# Patient Record
Sex: MALE | Race: BLACK OR AFRICAN AMERICAN | NOT HISPANIC OR LATINO | Employment: FULL TIME | URBAN - METROPOLITAN AREA
[De-identification: names, ages, dates, MRNs, and addresses within clinical notes are randomized per-mention and may not be internally consistent; named-entity substitution may affect disease eponyms.]

---

## 2023-11-24 ENCOUNTER — ANCILLARY PROCEDURE (OUTPATIENT)
Dept: RADIOLOGY | Facility: CLINIC | Age: 23
End: 2023-11-24
Payer: OTHER GOVERNMENT

## 2023-11-24 DIAGNOSIS — S09.92XA INJURY OF NOSE, INITIAL ENCOUNTER: ICD-10-CM

## 2023-11-24 PROCEDURE — 70160 X-RAY EXAM OF NASAL BONES: CPT | Mod: FY

## 2023-11-24 PROCEDURE — 70160 X-RAY EXAM OF NASAL BONES: CPT | Performed by: RADIOLOGY

## 2023-12-27 ENCOUNTER — OFFICE VISIT (OUTPATIENT)
Dept: PEDIATRICS | Facility: CLINIC | Age: 23
End: 2023-12-27
Payer: OTHER GOVERNMENT

## 2023-12-27 VITALS
SYSTOLIC BLOOD PRESSURE: 110 MMHG | HEIGHT: 66 IN | WEIGHT: 159.06 LBS | HEART RATE: 76 BPM | BODY MASS INDEX: 25.56 KG/M2 | DIASTOLIC BLOOD PRESSURE: 65 MMHG

## 2023-12-27 DIAGNOSIS — L70.0 ACNE VULGARIS: ICD-10-CM

## 2023-12-27 DIAGNOSIS — Z00.00 WELLNESS EXAMINATION: Primary | ICD-10-CM

## 2023-12-27 PROCEDURE — 99395 PREV VISIT EST AGE 18-39: CPT | Performed by: PEDIATRICS

## 2023-12-27 PROCEDURE — 1036F TOBACCO NON-USER: CPT | Performed by: PEDIATRICS

## 2023-12-27 PROCEDURE — 87800 DETECT AGNT MULT DNA DIREC: CPT

## 2023-12-27 RX ORDER — ADAPALENE 0.1 G/100G
CREAM TOPICAL NIGHTLY
Qty: 45 G | Refills: 11 | Status: SHIPPED | OUTPATIENT
Start: 2023-12-27 | End: 2024-12-26

## 2023-12-27 NOTE — PROGRESS NOTES
"Subjective   History was provided by the  patient .  Des Ferrer is a 23 y.o. male who is here for this well-child visit.    Current Issues:  Current concerns include: recently broke his nose, was boxing, still having issues breathing out of the left nostril.  Vision or hearing concerns? no  Dental care up to date? Yes- brushes teeth 2 times/day , regular dental visits , does floss teeth     Review of Nutrition, Elimination, and Sleep:  Current diet:  no restrictions- 3 meals/day , well balanced diet , normal portions , fast food <1 time per week , <8oz. sugar containing beverages daily , appropriate dairy intake , appropriate fruits, vegetables, and protein intake  Elimination: normal bowel movement frequency , normal consistency   Sleep: has structured bedtime routine , sleeps through the night , no trouble getting up    School and Behavior Screening:  School performance:   Sports Participation Screening:  Gets regular exercise , participates in  boxing    Screening Questions:  Other: normal mood , denies suicidal ideations, satisfied with body weight    Risk factors for sexually-transmitted infections:   Sexually active: no   Using condoms: N/A  Risk factors for alcohol/drug use:  no  Smoking - No  Vaping - No  Drinking - Yes  Genitourinary: aware of pubertal changes       Objective   /65 (BP Location: Right arm, Patient Position: Sitting)   Pulse 76   Ht 1.676 m (5' 6\")   Wt 72.2 kg (159 lb 1 oz)   BMI 25.67 kg/m²   Growth parameters are noted and are appropriate for age.    Physical Exam  Constitutional:       Appearance: Normal appearance.   HENT:      Right Ear: Tympanic membrane normal.      Left Ear: Tympanic membrane normal.      Nose: Nose normal.      Left Nostril: Occlusion present.      Left Turbinates: Swollen.      Mouth/Throat:      Mouth: Mucous membranes are moist.      Pharynx: Oropharynx is clear.   Eyes:      Extraocular Movements: Extraocular movements intact.   Cardiovascular: "      Rate and Rhythm: Normal rate and regular rhythm.      Pulses: Normal pulses.           Femoral pulses are 2+ on the right side and 2+ on the left side.     Heart sounds: No murmur heard.  Pulmonary:      Effort: Pulmonary effort is normal.      Breath sounds: Normal breath sounds.   Abdominal:      General: Abdomen is flat. Bowel sounds are normal.      Palpations: Abdomen is soft. There is no hepatomegaly, splenomegaly or mass.   Genitourinary:     Comments: Pt declines exam  Musculoskeletal:         General: Normal range of motion.      Cervical back: Normal range of motion and neck supple.      Thoracic back: No scoliosis.      Lumbar back: No scoliosis.   Lymphadenopathy:      Cervical: No cervical adenopathy.   Skin:     General: Skin is warm.      Findings: Acne present.   Neurological:      General: No focal deficit present.      Mental Status: He is alert.      Deep Tendon Reflexes:      Reflex Scores:       Patellar reflexes are 2+ on the right side and 2+ on the left side.  Psychiatric:         Mood and Affect: Mood normal.         Behavior: Behavior normal.         Assessment/Plan   Diagnoses and all orders for this visit:  Wellness examination  Other orders  -     1 Year Follow Up In Pediatrics; Future    Well adolescent male.  - Anticipatory guidance discussed.   - Injury prevention: wearing seatbelt , understanding sun protection , understanding conflict resolution/violence prevention,  reviewed driving safety    -Risk Taking: cardiac risk factors reviewed , alcohol, drug and tobacco use reviewed , reviewed internet safety      -use flonase for the swelling in the nose  - acne care discussed

## 2023-12-29 LAB
C TRACH RRNA SPEC QL NAA+PROBE: NEGATIVE
N GONORRHOEA DNA SPEC QL PROBE+SIG AMP: NEGATIVE